# Patient Record
Sex: MALE | Race: WHITE | NOT HISPANIC OR LATINO | Employment: FULL TIME | URBAN - METROPOLITAN AREA
[De-identification: names, ages, dates, MRNs, and addresses within clinical notes are randomized per-mention and may not be internally consistent; named-entity substitution may affect disease eponyms.]

---

## 2024-08-27 RX ORDER — ASPIRIN 81 MG/1
1 TABLET ORAL DAILY
COMMUNITY
Start: 2021-10-11

## 2024-08-27 RX ORDER — CETIRIZINE HYDROCHLORIDE 10 MG/1
1 TABLET ORAL DAILY
COMMUNITY
Start: 2021-09-02

## 2024-08-27 RX ORDER — ATORVASTATIN CALCIUM 10 MG/1
1 TABLET, FILM COATED ORAL DAILY
COMMUNITY
Start: 2021-10-11 | End: 2024-08-28 | Stop reason: SDUPTHER

## 2024-08-27 RX ORDER — MULTIVITAMIN
1 TABLET ORAL DAILY
COMMUNITY
Start: 2021-09-02

## 2024-08-28 ENCOUNTER — OFFICE VISIT (OUTPATIENT)
Dept: CARDIOLOGY | Facility: CLINIC | Age: 54
End: 2024-08-28
Payer: COMMERCIAL

## 2024-08-28 VITALS
HEIGHT: 71 IN | WEIGHT: 187 LBS | SYSTOLIC BLOOD PRESSURE: 126 MMHG | HEART RATE: 81 BPM | DIASTOLIC BLOOD PRESSURE: 69 MMHG | OXYGEN SATURATION: 98 % | BODY MASS INDEX: 26.18 KG/M2

## 2024-08-28 DIAGNOSIS — Z98.890 HISTORY OF REPAIR OF THORACIC AORTIC ANEURYSM: ICD-10-CM

## 2024-08-28 DIAGNOSIS — Z86.79 HISTORY OF REPAIR OF THORACIC AORTIC ANEURYSM: ICD-10-CM

## 2024-08-28 DIAGNOSIS — E78.5 HYPERLIPIDEMIA, UNSPECIFIED HYPERLIPIDEMIA TYPE: Primary | ICD-10-CM

## 2024-08-28 PROCEDURE — 99213 OFFICE O/P EST LOW 20 MIN: CPT | Mod: 25 | Performed by: INTERNAL MEDICINE

## 2024-08-28 PROCEDURE — 93005 ELECTROCARDIOGRAM TRACING: CPT | Performed by: INTERNAL MEDICINE

## 2024-08-28 PROCEDURE — 3008F BODY MASS INDEX DOCD: CPT | Performed by: INTERNAL MEDICINE

## 2024-08-28 PROCEDURE — 93010 ELECTROCARDIOGRAM REPORT: CPT | Performed by: INTERNAL MEDICINE

## 2024-08-28 PROCEDURE — 99213 OFFICE O/P EST LOW 20 MIN: CPT | Performed by: INTERNAL MEDICINE

## 2024-08-28 RX ORDER — MOMETASONE FUROATE 1 MG/ML
SOLUTION TOPICAL
COMMUNITY
Start: 2024-08-08

## 2024-08-28 RX ORDER — ATORVASTATIN CALCIUM 10 MG/1
10 TABLET, FILM COATED ORAL DAILY
Qty: 90 TABLET | Refills: 3 | Status: SHIPPED | OUTPATIENT
Start: 2024-08-28

## 2024-08-28 ASSESSMENT — COLUMBIA-SUICIDE SEVERITY RATING SCALE - C-SSRS
1. IN THE PAST MONTH, HAVE YOU WISHED YOU WERE DEAD OR WISHED YOU COULD GO TO SLEEP AND NOT WAKE UP?: NO
6. HAVE YOU EVER DONE ANYTHING, STARTED TO DO ANYTHING, OR PREPARED TO DO ANYTHING TO END YOUR LIFE?: NO
2. HAVE YOU ACTUALLY HAD ANY THOUGHTS OF KILLING YOURSELF?: NO

## 2024-08-28 ASSESSMENT — PATIENT HEALTH QUESTIONNAIRE - PHQ9
SUM OF ALL RESPONSES TO PHQ9 QUESTIONS 1 AND 2: 0
2. FEELING DOWN, DEPRESSED OR HOPELESS: NOT AT ALL
1. LITTLE INTEREST OR PLEASURE IN DOING THINGS: NOT AT ALL

## 2024-08-28 NOTE — PATIENT INSTRUCTIONS
You were seen in the [facility] for follow up of your recent surgery to replace your thoracic aorta aneurysm (enlargement of the aorta) on 10/6/2021 with a 28 mm Gelweave prosthetic graft and repair your aortic valve.     You are doing well from recovering from surgery in 2021 and now are fully recovered. You can continue resistance training exercise, lifting objects at home, and using the .     Continue atorvastatin 10 mg a day to control your cholesterol. Your August 2022 cholesterol blood work is at goal for long term protection. I renewed this medication for another 12 months.     Eat a heart healthy diet. Limit portions of red meat. Eat fresh fruits and vegetables instead of processed carbohydrates. Olive oil (1 tablespoon a day), avocados, tree nuts as a source of omega-3 fat. Beans and legumes are good sources of plant based protein and fiber. The Mediterranean diet has been shown in clinical trials to reduce risk of heart disease by following these principles.      Follow up with Dr. Davis in 12 months.

## 2024-08-28 NOTE — PROGRESS NOTES
Subjective   Matthew Wade is a 54 y.o. male who presents to the [facility] for follow up of 10/6/2021 severe ascending thoracic aorta aneurysm replacement with prosthetic graft with aortic root reconstruction (replacement of NCC sinus with graft) and AV repair with anterior commissuroplasty. Last seen 2023     He had a severely enlarged ascending aorta aneurysm at 5.6 cm on 7/15/2021 CT chest scan (CT calcium score was low at 6). Had 10/6/2021 ascending aorta replacement with 28 mm Gelweave prosthesis and resection of NCC aneurysm with suture of Gelweave graft into the NCC. Partial repair of the aortic valve performed with suture of the anterior AV commissure by Dr. Nash and Dr. Miller.      Since our last visit, no active cardiac symptoms of chest pain, dyspnea on exertion, PND, orthopnea, MARCELLUS, palpitations, syncope, or claudication. Able to sleep on back or side without symptoms. Has resumed aerobic exercise at home. Weaned off metoprolol in spring 2022.     Prior heart testing included 3/24/2021 stress ECG treadmill test that was negative for ischemia at 11 METs of physical activity.     Past Medical History:  1. Thoracic aorta aneurysm s/p 10/6/2021 Gelweave 28 mm graft replacement  2. Trace coronary arteriosclerosis: 7/15/2021 CT calcium score 6  3. Dyslipidemia     Social History:  Lifetime nonsmoker. Workings in banking.     Family History:  Father  suddenly at 58 yo. Death certificate indicates MI as cause of death.     Review of Systems    A 14 point review of systems was asked. All questions were negative except for pertinent positives listed in the HPI.     Current Outpatient Medications on File Prior to Visit   Medication Sig Dispense Refill    aspirin 81 mg EC tablet Take 1 tablet (81 mg) by mouth once daily.      atorvastatin (Lipitor) 10 mg tablet Take 1 tablet (10 mg) by mouth once daily.      cetirizine (ZyrTEC) 10 mg tablet Take 1 tablet (10 mg) by  "mouth once daily.      mometasone (Elocon) 0.1 % lotion Apply a thin film topically 2 to 3 times per week as directed to affected areas      multivitamin tablet Take 1 tablet by mouth once daily.       No current facility-administered medications on file prior to visit.         Objective   Physical Exam  BP Readings from Last 3 Encounters:   24 126/69   23 123/71   22 110/72      Wt Readings from Last 3 Encounters:   24 84.8 kg (187 lb)   23 85.7 kg (189 lb)   22 83.6 kg (184 lb 4 oz)      BMI: Estimated body mass index is 26.08 kg/m² as calculated from the following:    Height as of this encounter: 1.803 m (5' 11\").    Weight as of this encounter: 84.8 kg (187 lb).  BSA: Estimated body surface area is 2.06 meters squared as calculated from the following:    Height as of this encounter: 1.803 m (5' 11\").    Weight as of this encounter: 84.8 kg (187 lb).    General: no acute distress  HEENT: EOMI, no scleral icterus.  Lungs: Clear to auscultation bilaterally without wheezing, rales, or rhonchi.  Cardiovascular: Regular rhythm and rate. Normal S1 and S2. No murmurs, rubs, or gallops are appreciated. JVP normal.  Abdomen: Soft, nontender, nondistended. Bowel sounds present.  Extremities: Warm and well perfused with equal 2+ pulses bilaterally.  No edema present.  Neurologic: Alert and oriented x3.      I have personally reviewed the following images and laboratory findings:  Last echocardiogram:  Most recent echo, 10/11/2021: LV EF 60-65%, no LVH (LVMI 49 gm/m2), post-op septal movement, normal diastology (E/e' 7), normal LA size, RV/RA not well seen, trace-mild AI, biuspid AV (peak demetris 2.26 m/sec, peak/mean AV gradient 20/10 mm Hg, RAKEL 1.6 cm2, DI 0.40), trace MR, trace TR, unable to estimate RVSP,     Last cath / stress test:  2021 CCTA: No coronary obstructive disease. Ascending aorta 5.4 cm. Bicuspid AV.    Most recent EC2024 ECG: Sinus rhythm, 81 bpm. Personally " "reviewed in office.    Lab Results   Component Value Date    CHOL 140 08/01/2022    CHOL 226 (H) 02/20/2021     Lab Results   Component Value Date    HDL 41.5 08/01/2022    HDL 47.0 02/20/2021     No results found for: \"LDLCALC\"  Lab Results   Component Value Date    TRIG 185 (H) 08/01/2022    TRIG 146 02/20/2021     No components found for: \"CHOLHDL\"      Assessment/Plan   1. Severe thoracic ascending aorta aneurysm s/p 10/6/2021 surgical replacement:    Ascending aorta was severely enlarged at 5.6 cm on 7/15/2021 CT chest scan. Had 10/6/2021 ascending aorta replacement with 28 mm Gelweave prosthesis and resection of NCC aneurysm with suture of Gelweave graft into the NCC. Partial repair of the bicuspid aortic valve performed with suture of the anterior AV commissure by Dr. Nash and Dr. Miller. Continue aspirin 81 mg a day for long term prophylaxis of graft material and aortic valve repair.     No murmur on exam today. Will observe without further heart testing at this visit.     2. Dyslipidemia / Trace coronary arteriosclerosis:  Continue atorvastatin 10 mg a day. Taking aspirin 81 mg a day for Gelweave aorta prosthesis. August 2022 lipid panel at goal.     Atorvastatin 10 mg day renewed for another 12 months.     Follow up with Dr. Davis in 12 months.            SIGNATURE: Roger Davis M.D.  [facility]  Senior Attending, Division of Cardiovascular Medicine  Co-Director, [facility]   of Medicine  [facility]  [address] [facility] [address]  [place] [id number]  Phone: [phone]  Fax: [phone]  Appointment: [phone]  PATIENT NAME: Matthew Wade   DATE/TIME: August 28, 2024 8:29 AM MRN: [id number]     "

## 2024-08-29 LAB
ATRIAL RATE: 81 BPM
P AXIS: 32 DEGREES
P OFFSET: 185 MS
P ONSET: 134 MS
PR INTERVAL: 166 MS
Q ONSET: 217 MS
QRS COUNT: 13 BEATS
QRS DURATION: 98 MS
QT INTERVAL: 372 MS
QTC CALCULATION(BAZETT): 432 MS
QTC FREDERICIA: 411 MS
R AXIS: 73 DEGREES
T AXIS: 64 DEGREES
T OFFSET: 403 MS
VENTRICULAR RATE: 81 BPM

## 2024-09-04 ENCOUNTER — HOSPITAL ENCOUNTER (OUTPATIENT)
Dept: RADIOLOGY | Facility: CLINIC | Age: 54
Discharge: HOME | End: 2024-09-04
Payer: COMMERCIAL

## 2024-09-04 ENCOUNTER — OFFICE VISIT (OUTPATIENT)
Dept: PRIMARY CARE | Facility: CLINIC | Age: 54
End: 2024-09-04
Payer: COMMERCIAL

## 2024-09-04 VITALS
HEART RATE: 95 BPM | BODY MASS INDEX: 26.57 KG/M2 | WEIGHT: 189.8 LBS | DIASTOLIC BLOOD PRESSURE: 66 MMHG | HEIGHT: 71 IN | SYSTOLIC BLOOD PRESSURE: 128 MMHG | OXYGEN SATURATION: 97 %

## 2024-09-04 DIAGNOSIS — M79.671 PAIN OF RIGHT HEEL: ICD-10-CM

## 2024-09-04 DIAGNOSIS — M76.60 ACHILLES TENDON PAIN: ICD-10-CM

## 2024-09-04 DIAGNOSIS — M76.60 ACHILLES TENDON PAIN: Primary | ICD-10-CM

## 2024-09-04 PROCEDURE — 73610 X-RAY EXAM OF ANKLE: CPT | Mod: RT

## 2024-09-04 PROCEDURE — 3008F BODY MASS INDEX DOCD: CPT | Performed by: NURSE PRACTITIONER

## 2024-09-04 PROCEDURE — 99213 OFFICE O/P EST LOW 20 MIN: CPT | Performed by: NURSE PRACTITIONER

## 2024-09-04 PROCEDURE — 73630 X-RAY EXAM OF FOOT: CPT | Mod: RIGHT SIDE | Performed by: RADIOLOGY

## 2024-09-04 PROCEDURE — 73630 X-RAY EXAM OF FOOT: CPT | Mod: RT

## 2024-09-04 RX ORDER — METHYLPREDNISOLONE 4 MG/1
TABLET ORAL
Qty: 21 TABLET | Refills: 0 | Status: SHIPPED | OUTPATIENT
Start: 2024-09-04 | End: 2024-09-11

## 2024-09-04 ASSESSMENT — PATIENT HEALTH QUESTIONNAIRE - PHQ9
1. LITTLE INTEREST OR PLEASURE IN DOING THINGS: NOT AT ALL
2. FEELING DOWN, DEPRESSED OR HOPELESS: NOT AT ALL
SUM OF ALL RESPONSES TO PHQ9 QUESTIONS 1 AND 2: 0

## 2024-09-04 NOTE — PROGRESS NOTES
"Subjective   Patient ID: Matthew Wade is a 54 y.o. male who presents for Ankle Pain (Right foot, onset about 2 weeks ago and worsened Sunday. Patient admits to tightness. ).    Patient of Dr. Perdomo.    Presents with acute right posterior ankle/achilles pain.  Started 2 weeks ago. Pain has been intermittent. Admits to walking around [place] recently and no issues, walked around the ER a lot today without much issue. Hurts in point specific places. Posterior heel and lateral ankle area.   He endorses walking with a slight limp due to the pain.  Ice helps.  Heel feels numb and most of the pain in the on the lateral ankle.  No foot drop or weakness.       Review of Systems  otherwise negative aside from what was mentioned above in HPI.    Objective   /66 (BP Location: Right arm, Patient Position: Sitting, BP Cuff Size: Adult)   Pulse 95   Ht 1.803 m (5' 11\")   Wt 86.1 kg (189 lb 12.8 oz)   SpO2 97%   BMI 26.47 kg/m²     Physical Exam  Constitutional:       Appearance: Normal appearance.   Cardiovascular:      Rate and Rhythm: Normal rate and regular rhythm.   Pulmonary:      Effort: Pulmonary effort is normal.   Musculoskeletal:         General: Tenderness present.      Comments: Point tender posterior heel and lateral ankle. Normal ROM. Slight swelling to achilles region.   Neurological:      General: No focal deficit present.      Mental Status: He is alert and oriented to person, place, and time. Mental status is at baseline.   Psychiatric:         Mood and Affect: Mood normal.         Behavior: Behavior normal.         Thought Content: Thought content normal.         Judgment: Judgment normal.         Assessment/Plan   Problem List Items Addressed This Visit             ICD-10-CM    Pain of right heel M79.671     XR of heel and ankle to rule out bony spur.  Can't take NSAIDs due to heart history.  Medrol pack ordered in case ice is not sufficient to help.  Discussed if no bony spurs likely an " achilles tendonitis. Gentle stretching, brace and ice.          Relevant Medications    methylPREDNISolone (Medrol Dospak) 4 mg tablets    Other Relevant Orders    XR ankle right 3+ views    XR foot right 3+ views    Achilles tendon pain - Primary M76.60    Relevant Medications    methylPREDNISolone (Medrol Dospak) 4 mg tablets    Other Relevant Orders    XR ankle right 3+ views    XR foot right 3+ views

## 2024-09-04 NOTE — ASSESSMENT & PLAN NOTE
XR of heel and ankle to rule out bony spur.  Can't take NSAIDs due to heart history.  Medrol pack ordered in case ice is not sufficient to help.  Discussed if no bony spurs likely an achilles tendonitis. Gentle stretching, brace and ice.

## 2025-01-07 ENCOUNTER — LAB (OUTPATIENT)
Dept: LAB | Facility: LAB | Age: 55
End: 2025-01-07
Payer: COMMERCIAL

## 2025-01-07 ENCOUNTER — APPOINTMENT (OUTPATIENT)
Dept: PRIMARY CARE | Facility: CLINIC | Age: 55
End: 2025-01-07
Payer: COMMERCIAL

## 2025-01-07 VITALS
BODY MASS INDEX: 25.98 KG/M2 | HEIGHT: 71 IN | WEIGHT: 185.6 LBS | DIASTOLIC BLOOD PRESSURE: 62 MMHG | SYSTOLIC BLOOD PRESSURE: 114 MMHG | HEART RATE: 93 BPM | OXYGEN SATURATION: 98 %

## 2025-01-07 DIAGNOSIS — Z00.00 ROUTINE HEALTH MAINTENANCE: Primary | ICD-10-CM

## 2025-01-07 DIAGNOSIS — Z12.5 SCREENING FOR PROSTATE CANCER: ICD-10-CM

## 2025-01-07 DIAGNOSIS — Z12.11 SCREEN FOR COLON CANCER: ICD-10-CM

## 2025-01-07 DIAGNOSIS — Z00.00 ROUTINE HEALTH MAINTENANCE: ICD-10-CM

## 2025-01-07 LAB
ALBUMIN SERPL BCP-MCNC: 4.7 G/DL (ref 3.4–5)
ALP SERPL-CCNC: 58 U/L (ref 33–120)
ALT SERPL W P-5'-P-CCNC: 26 U/L (ref 10–52)
ANION GAP SERPL CALC-SCNC: 13 MMOL/L (ref 10–20)
AST SERPL W P-5'-P-CCNC: 21 U/L (ref 9–39)
BILIRUB SERPL-MCNC: 0.7 MG/DL (ref 0–1.2)
BUN SERPL-MCNC: 16 MG/DL (ref 6–23)
CALCIUM SERPL-MCNC: 9.7 MG/DL (ref 8.6–10.6)
CHLORIDE SERPL-SCNC: 105 MMOL/L (ref 98–107)
CHOLEST SERPL-MCNC: 169 MG/DL (ref 0–199)
CHOLESTEROL/HDL RATIO: 3.7
CO2 SERPL-SCNC: 27 MMOL/L (ref 21–32)
CREAT SERPL-MCNC: 1.09 MG/DL (ref 0.5–1.3)
EGFRCR SERPLBLD CKD-EPI 2021: 81 ML/MIN/1.73M*2
ERYTHROCYTE [DISTWIDTH] IN BLOOD BY AUTOMATED COUNT: 12.3 % (ref 11.5–14.5)
GLUCOSE SERPL-MCNC: 92 MG/DL (ref 74–99)
HCT VFR BLD AUTO: 45.7 % (ref 41–52)
HDLC SERPL-MCNC: 45.9 MG/DL
HGB BLD-MCNC: 14.7 G/DL (ref 13.5–17.5)
LDLC SERPL CALC-MCNC: 94 MG/DL
MCH RBC QN AUTO: 29.8 PG (ref 26–34)
MCHC RBC AUTO-ENTMCNC: 32.2 G/DL (ref 32–36)
MCV RBC AUTO: 93 FL (ref 80–100)
NON HDL CHOLESTEROL: 123 MG/DL (ref 0–149)
NRBC BLD-RTO: 0 /100 WBCS (ref 0–0)
PLATELET # BLD AUTO: 340 X10*3/UL (ref 150–450)
POTASSIUM SERPL-SCNC: 4.9 MMOL/L (ref 3.5–5.3)
PROT SERPL-MCNC: 7.3 G/DL (ref 6.4–8.2)
PSA SERPL-MCNC: 2.52 NG/ML
RBC # BLD AUTO: 4.94 X10*6/UL (ref 4.5–5.9)
SODIUM SERPL-SCNC: 140 MMOL/L (ref 136–145)
TRIGL SERPL-MCNC: 146 MG/DL (ref 0–149)
VLDL: 29 MG/DL (ref 0–40)
WBC # BLD AUTO: 5.9 X10*3/UL (ref 4.4–11.3)

## 2025-01-07 PROCEDURE — 80053 COMPREHEN METABOLIC PANEL: CPT

## 2025-01-07 PROCEDURE — 80061 LIPID PANEL: CPT

## 2025-01-07 PROCEDURE — 84153 ASSAY OF PSA TOTAL: CPT

## 2025-01-07 PROCEDURE — 3008F BODY MASS INDEX DOCD: CPT | Performed by: INTERNAL MEDICINE

## 2025-01-07 PROCEDURE — 99396 PREV VISIT EST AGE 40-64: CPT | Performed by: INTERNAL MEDICINE

## 2025-01-07 PROCEDURE — 85027 COMPLETE CBC AUTOMATED: CPT

## 2025-01-07 ASSESSMENT — ENCOUNTER SYMPTOMS
NERVOUS/ANXIOUS: 0
POLYPHAGIA: 0
CHEST TIGHTNESS: 0
DYSPHORIC MOOD: 0
VOMITING: 0
SORE THROAT: 0
UNEXPECTED WEIGHT CHANGE: 0
DYSURIA: 0
POLYDIPSIA: 0
HEMATURIA: 0
PALPITATIONS: 0
BLOOD IN STOOL: 0
FEVER: 0
ABDOMINAL PAIN: 0
CONSTIPATION: 0
CHILLS: 0
HEADACHES: 0
ARTHRALGIAS: 0
WOUND: 0
FREQUENCY: 0
SHORTNESS OF BREATH: 0
MYALGIAS: 0
RHINORRHEA: 0
COUGH: 0
NAUSEA: 0
DIARRHEA: 0
WHEEZING: 0
DIZZINESS: 0
EYE PAIN: 0

## 2025-01-07 ASSESSMENT — PATIENT HEALTH QUESTIONNAIRE - PHQ9
2. FEELING DOWN, DEPRESSED OR HOPELESS: NOT AT ALL
1. LITTLE INTEREST OR PLEASURE IN DOING THINGS: NOT AT ALL
SUM OF ALL RESPONSES TO PHQ9 QUESTIONS 1 AND 2: 0

## 2025-01-07 NOTE — PROGRESS NOTES
"Subjective   Patient ID: Matthew Wade is a 54 y.o. male who presents for Annual Exam.    HPI     Dental visits- has scheduled    Exercise- treadmill, biking and weightsDiet-    Alcohol use- social, rare  Smoking- none    Has had congestion and sore throat. Post nasal drip. No fevers or chills. Not bad    Feels well    Review of Systems   Constitutional:  Negative for chills, fever and unexpected weight change.   HENT:  Positive for congestion. Negative for hearing loss, rhinorrhea and sore throat.    Eyes:  Negative for pain and visual disturbance.   Respiratory:  Negative for cough, chest tightness, shortness of breath and wheezing.    Cardiovascular:  Negative for chest pain and palpitations.   Gastrointestinal:  Negative for abdominal pain, blood in stool, constipation, diarrhea, nausea and vomiting.   Endocrine: Negative for cold intolerance, heat intolerance, polydipsia and polyphagia.   Genitourinary:  Negative for dysuria, frequency and hematuria.   Musculoskeletal:  Negative for arthralgias and myalgias.   Skin:  Negative for rash and wound.   Neurological:  Negative for dizziness, syncope and headaches.   Psychiatric/Behavioral:  Negative for dysphoric mood. The patient is not nervous/anxious.        Objective   /62 (BP Location: Left arm, Patient Position: Sitting, BP Cuff Size: Large adult)   Pulse 93   Ht 1.803 m (5' 11\")   Wt 84.2 kg (185 lb 9.6 oz)   SpO2 98%   BMI 25.89 kg/m²     Physical Exam  Vitals reviewed.   Constitutional:       Appearance: Normal appearance. He is not ill-appearing.   HENT:      Head: Normocephalic and atraumatic.      Right Ear: Tympanic membrane normal.      Left Ear: Tympanic membrane normal.      Nose: Nose normal.      Mouth/Throat:      Mouth: Mucous membranes are moist.      Pharynx: Oropharynx is clear.   Eyes:      Extraocular Movements: Extraocular movements intact.      Conjunctiva/sclera: Conjunctivae normal.      Pupils: Pupils are equal, round, and " reactive to light.   Cardiovascular:      Rate and Rhythm: Normal rate and regular rhythm.   Pulmonary:      Effort: Pulmonary effort is normal.      Breath sounds: Normal breath sounds. No wheezing.   Abdominal:      General: There is no distension.      Palpations: Abdomen is soft. There is no mass.      Tenderness: There is no abdominal tenderness.   Musculoskeletal:      Cervical back: Neck supple.      Right lower leg: No edema.      Left lower leg: No edema.   Lymphadenopathy:      Cervical: No cervical adenopathy.   Neurological:      General: No focal deficit present.      Mental Status: He is alert and oriented to person, place, and time.      Gait: Gait normal.   Psychiatric:         Mood and Affect: Mood normal.         Behavior: Behavior normal.         Thought Content: Thought content normal.         Assessment/Plan   Problem List Items Addressed This Visit    None  Visit Diagnoses         Codes    Routine health maintenance    -  Primary Z00.00    Relevant Orders    CBC    Comprehensive Metabolic Panel    Lipid Panel    Screening for prostate cancer     Z12.5    Relevant Orders    Prostate Specific Antigen    Screen for colon cancer     Z12.11    Relevant Orders    Colonoscopy Screening; Average Risk Patient             CPE completed.  Advised to keep a heart healthy, low fat diet with fruits and veggies like Mediterranean diet.  Advised on the importance of exercise and maintaining 150 minutes of exercise per week (30 minutes per day 5 days a week).  Advised on regular eye and dental visits.  Discussed age appropriate cancer screening, immunizations and recommendations given.  Discussed avoiding illicit drugs and tobacco. Advised on appropriate use of alcohol.  Advised to wear seat belt.     S/p aortic aneurysm repair and aortic valve repair 10/21 s/p washout/bicuspid aortic valve: healing up well  -on aspirin  -seeing cards yearly    Thrombocytosis: reactive, all gone  -on ASA     Fam hx of  CAD/hyperlipidemia: on atorvastatin, controlled 8/22 except TG-low fat diet     MSK chest pain: advised sound MSK from gutters and uses pectoralis muscle. Discussed stretching.      Hx of left femur fracture      CPE 1 year. Labs ordered     Health Maintenance  -Prostate Cancer Screening: wants to do  -Vaccinations: Advised shingrix. UTD tdap. UTD covid  -Colonoscopy: ordered  Banker manager

## 2025-03-01 ENCOUNTER — OFFICE VISIT (OUTPATIENT)
Dept: URGENT CARE | Age: 55
End: 2025-03-01
Payer: COMMERCIAL

## 2025-03-01 VITALS
SYSTOLIC BLOOD PRESSURE: 122 MMHG | BODY MASS INDEX: 25.94 KG/M2 | WEIGHT: 186 LBS | RESPIRATION RATE: 16 BRPM | OXYGEN SATURATION: 99 % | DIASTOLIC BLOOD PRESSURE: 69 MMHG | TEMPERATURE: 98.1 F | HEART RATE: 89 BPM

## 2025-03-01 DIAGNOSIS — S39.012A LOW BACK STRAIN, INITIAL ENCOUNTER: Primary | ICD-10-CM

## 2025-03-01 DIAGNOSIS — M79.18 LUMBAR MUSCLE PAIN: ICD-10-CM

## 2025-03-01 RX ORDER — CYCLOBENZAPRINE HCL 10 MG
TABLET ORAL
Qty: 20 TABLET | Refills: 0 | Status: SHIPPED | OUTPATIENT
Start: 2025-03-01

## 2025-03-01 RX ORDER — PREDNISONE 20 MG/1
TABLET ORAL
Qty: 10 TABLET | Refills: 0 | Status: SHIPPED | OUTPATIENT
Start: 2025-03-01

## 2025-03-01 ASSESSMENT — ENCOUNTER SYMPTOMS
CARDIOVASCULAR NEGATIVE: 1
ALLERGIC/IMMUNOLOGIC NEGATIVE: 1
CONSTITUTIONAL NEGATIVE: 1
ENDOCRINE NEGATIVE: 1
EYES NEGATIVE: 1
HEMATOLOGIC/LYMPHATIC NEGATIVE: 1
RESPIRATORY NEGATIVE: 1
GASTROINTESTINAL NEGATIVE: 1
NEUROLOGICAL NEGATIVE: 1
PSYCHIATRIC NEGATIVE: 1

## 2025-03-01 NOTE — LETTER
March 1, 2025     Patient: Matthew Wade   YOB: 1970   Date of Visit: 3/1/2025       To Whom It May Concern:    Matthew Wade was seen in my clinic on 3/1/2025 at 9:45 am. Please excuse Matthew for his absence from work on 3/1/25 , 3/2/25 , 3/3/25. May return to work on 3/4/25     If you have any questions or concerns, please don't hesitate to call.         Sincerely,         Lisa Thomas, NANCY-CNP        CC: No Recipients

## 2025-03-01 NOTE — PROGRESS NOTES
Subjective   Patient ID: Matthew Wade is a 54 y.o. male. They present today with a chief complaint of Back Pain (Lower back - 1 day - 6/10 ).    History of Present Illness  Patient is a 55 y/o male presenting with low back pain/strain flare-up x1 day. Patient reports low back injury initially occurred x30 years prior and flare-ups occur on occasion. Patient denies recent trauma/injury to region. Patient denies symptoms of numbness/tingling of extremities, loss of bowel/bladder control, difficulty ambulating, inability to bear weight on extremities, fever, chills, bodyaches, lethargy, weakness, chest pain/tightness/pressure, SOB, wheezing, N/V/D, ABD pain. OTC Tylenol taken with slight relief of symptoms.          Past Medical History  Allergies as of 03/01/2025 - Reviewed 03/01/2025   Allergen Reaction Noted    Amoxicillin Unknown 08/28/2024       (Not in a hospital admission)       History reviewed. No pertinent past medical history.    Past Surgical History:   Procedure Laterality Date    OTHER SURGICAL HISTORY  10/14/2021    Aortic valve repair        reports that he has never smoked. He has never used smokeless tobacco. He reports current alcohol use. He reports that he does not use drugs.    Review of Systems  Review of Systems   Constitutional: Negative.    HENT: Negative.     Eyes: Negative.    Respiratory: Negative.     Cardiovascular: Negative.    Gastrointestinal: Negative.    Endocrine: Negative.    Genitourinary: Negative.    Musculoskeletal:         Low back pain   Skin: Negative.    Allergic/Immunologic: Negative.    Neurological: Negative.    Hematological: Negative.    Psychiatric/Behavioral: Negative.                                    Objective    Vitals:    03/01/25 0954   BP: 122/69   Pulse: 89   Resp: 16   Temp: 36.7 °C (98.1 °F)   SpO2: 99%   Weight: 84.4 kg (186 lb)     No LMP for male patient.    Physical Exam  Constitutional:       Comments: Patient A/O x4, LOC 5, calm and cooperative.  Patient self-ambulatory to treatment area without a limp, and is in no acute distress.    HENT:      Head: Normocephalic and atraumatic.      Mouth/Throat:      Mouth: Mucous membranes are moist.   Eyes:      Extraocular Movements: Extraocular movements intact.      Conjunctiva/sclera: Conjunctivae normal.      Pupils: Pupils are equal, round, and reactive to light.   Cardiovascular:      Rate and Rhythm: Normal rate and regular rhythm.      Pulses: Normal pulses.      Heart sounds: Normal heart sounds.   Pulmonary:      Effort: Pulmonary effort is normal.      Breath sounds: Normal breath sounds.   Abdominal:      General: Abdomen is flat.   Genitourinary:     Comments: Negative CV tenderness bilaterally  Musculoskeletal:      Cervical back: Neck supple.      Comments: Bilateral cervical tilt/rotation, shoulder shrug, handgrip, bicep/tricep push-pull strengths, quadricep raise, calf resistance, dorsiplantar flexion/extension strengths are 5/5 and equal bilaterally. Patient reports increase in pain to bilateral lumbar regions with torso rotation and tilt along with bilateral quadricep raise. Sensation to light and deep palpation intact throughout; no paresthesias noted. Passive and active ROM completed with ease.    Skin:     General: Skin is warm and dry.      Capillary Refill: Capillary refill takes less than 2 seconds.   Neurological:      General: No focal deficit present.      Mental Status: He is oriented to person, place, and time.   Psychiatric:         Mood and Affect: Mood normal.         Behavior: Behavior normal.         Procedures    Point of Care Test & Imaging Results from this visit  No results found for this visit on 03/01/25.   No results found.    Diagnostic study results (if any) were reviewed by ARVIND Bell.    Assessment/Plan   Allergies, medications, history, and pertinent labs/EKGs/Imaging reviewed by ARVIND Bell.     Medical Decision Making  -Patient declined XR imaging  in office; reports no trauma/injury  -Will refer patient to Orthopedics for further follow-up and evaluation of chronic low back pain with flare-ups  -Will send Rx of Prednisone, Cyclobenzaprine for symptom support  -OTC Tylenol, warm compress as tolerated  -ED precautions discussed with patient and spouse    At time of discharge, patient was clinically well-appearing and appropriate for outpatient management. The patient/parent/guardian was educated regarding diagnosis, supportive care, OTC and Rx medications. The patient/parent/guardian was given the opportunity to ask questions prior to discharge. They verbalized understanding of discussion of treatment plan, expected course of illness and/or injury, indications on when to return to , when to seek further evaluation in ED/call 911, and the need to follow up with PCP and/or specialist as referred. Patient/parent/guardian was provided with work/school documentation if requested. Patient stable upon discharge.     Orders and Diagnoses  Diagnoses and all orders for this visit:  Low back strain, initial encounter  -     cyclobenzaprine (Flexeril) 10 mg tablet; Take 1 tablet (10mg) by mouth every 8 hours as needed for muscle pain/spasms. May cause drowsiness  -     predniSONE (Deltasone) 20 mg tablet; Take 1 tablet (20mg) by mouth twice daily x5 days. Take with food  -     Referral to Orthopaedic Surgery; Future  Lumbar muscle pain      Medical Admin Record      Patient disposition: Home    Electronically signed by ARVIND Bell  10:38 AM

## 2025-05-28 ENCOUNTER — TELEPHONE (OUTPATIENT)
Dept: SCHEDULING | Age: 55
End: 2025-05-28

## 2025-05-28 DIAGNOSIS — E78.5 HYPERLIPIDEMIA, UNSPECIFIED HYPERLIPIDEMIA TYPE: ICD-10-CM

## 2025-05-28 RX ORDER — ATORVASTATIN CALCIUM 10 MG/1
10 TABLET, FILM COATED ORAL DAILY
Qty: 90 TABLET | Refills: 3 | Status: SHIPPED | OUTPATIENT
Start: 2025-05-28

## 2025-07-22 ENCOUNTER — OFFICE VISIT (OUTPATIENT)
Dept: PRIMARY CARE | Facility: CLINIC | Age: 55
End: 2025-07-22
Payer: COMMERCIAL

## 2025-07-22 VITALS
WEIGHT: 188.6 LBS | OXYGEN SATURATION: 96 % | SYSTOLIC BLOOD PRESSURE: 124 MMHG | HEART RATE: 86 BPM | HEIGHT: 71 IN | BODY MASS INDEX: 26.4 KG/M2 | DIASTOLIC BLOOD PRESSURE: 68 MMHG

## 2025-07-22 DIAGNOSIS — L23.7 POISON IVY DERMATITIS: Primary | ICD-10-CM

## 2025-07-22 PROCEDURE — 3008F BODY MASS INDEX DOCD: CPT | Performed by: INTERNAL MEDICINE

## 2025-07-22 PROCEDURE — 1036F TOBACCO NON-USER: CPT | Performed by: INTERNAL MEDICINE

## 2025-07-22 PROCEDURE — 99213 OFFICE O/P EST LOW 20 MIN: CPT | Performed by: INTERNAL MEDICINE

## 2025-07-22 RX ORDER — PREDNISONE 10 MG/1
TABLET ORAL
Qty: 50 TABLET | Refills: 0 | Status: SHIPPED | OUTPATIENT
Start: 2025-07-22 | End: 2025-08-11

## 2025-07-22 RX ORDER — TRIAMCINOLONE ACETONIDE 1 MG/G
OINTMENT TOPICAL 2 TIMES DAILY PRN
Qty: 80 G | Refills: 0 | Status: SHIPPED | OUTPATIENT
Start: 2025-07-22 | End: 2025-11-19

## 2025-07-22 NOTE — PROGRESS NOTES
"Subjective   Patient ID: Matthew Wade is a 55 y.o. male who presents for Poison Ivy (First noticed Sunday, right and left arms/ hands.).    HPI     Here for poison ivy  Came in contact over weekend  Has in yard  Noticed Sunday and has spread  On arms and chest  Itchy  Has used calamine lotion    Review of Systems   All other systems reviewed and are negative.      Objective   /68 (BP Location: Left arm, Patient Position: Sitting, BP Cuff Size: Adult)   Pulse 86   Ht 1.803 m (5' 10.98\")   Wt 85.5 kg (188 lb 9.6 oz)   SpO2 96%   BMI 26.32 kg/m²     Physical Exam    Skin:     Comments: Linear vesicles noted with excoriations and area of raised erythematous patches on arms and chest         Assessment/Plan   Problem List Items Addressed This Visit    None  Visit Diagnoses         Codes      Poison ivy dermatitis    -  Primary L23.7    Relevant Medications    triamcinolone (Kenalog) 0.1 % ointment    predniSONE (Deltasone) 10 mg tablet        Steroid cream bid and prn  Advised can try this first and if not better steroid. Advised if miserable and cannot wait, can start prednisone. Advised to finish it if starts it  Call if not going away or changing       "

## 2025-08-27 ENCOUNTER — OFFICE VISIT (OUTPATIENT)
Dept: CARDIOLOGY | Facility: CLINIC | Age: 55
End: 2025-08-27
Payer: COMMERCIAL

## 2025-08-27 VITALS
HEIGHT: 70 IN | SYSTOLIC BLOOD PRESSURE: 127 MMHG | OXYGEN SATURATION: 98 % | BODY MASS INDEX: 26.92 KG/M2 | WEIGHT: 188 LBS | DIASTOLIC BLOOD PRESSURE: 66 MMHG | HEART RATE: 83 BPM

## 2025-08-27 DIAGNOSIS — Z86.79 HISTORY OF REPAIR OF THORACIC AORTIC ANEURYSM: ICD-10-CM

## 2025-08-27 DIAGNOSIS — Z98.890 HISTORY OF REPAIR OF THORACIC AORTIC ANEURYSM: ICD-10-CM

## 2025-08-27 DIAGNOSIS — E78.5 HYPERLIPIDEMIA, UNSPECIFIED HYPERLIPIDEMIA TYPE: Primary | ICD-10-CM

## 2025-08-27 PROCEDURE — 3008F BODY MASS INDEX DOCD: CPT | Performed by: INTERNAL MEDICINE

## 2025-08-27 PROCEDURE — 99213 OFFICE O/P EST LOW 20 MIN: CPT | Performed by: INTERNAL MEDICINE

## 2025-08-27 PROCEDURE — 99212 OFFICE O/P EST SF 10 MIN: CPT

## 2025-08-27 PROCEDURE — 93005 ELECTROCARDIOGRAM TRACING: CPT | Performed by: INTERNAL MEDICINE

## 2025-08-27 RX ORDER — ATORVASTATIN CALCIUM 10 MG/1
10 TABLET, FILM COATED ORAL DAILY
Qty: 90 TABLET | Refills: 3 | Status: SHIPPED | OUTPATIENT
Start: 2025-08-27

## 2025-08-27 ASSESSMENT — ENCOUNTER SYMPTOMS
OCCASIONAL FEELINGS OF UNSTEADINESS: 0
DEPRESSION: 0
LOSS OF SENSATION IN FEET: 0

## 2025-08-27 ASSESSMENT — PATIENT HEALTH QUESTIONNAIRE - PHQ9
2. FEELING DOWN, DEPRESSED OR HOPELESS: NOT AT ALL
SUM OF ALL RESPONSES TO PHQ9 QUESTIONS 1 AND 2: 0
1. LITTLE INTEREST OR PLEASURE IN DOING THINGS: NOT AT ALL

## 2025-08-27 ASSESSMENT — COLUMBIA-SUICIDE SEVERITY RATING SCALE - C-SSRS
1. IN THE PAST MONTH, HAVE YOU WISHED YOU WERE DEAD OR WISHED YOU COULD GO TO SLEEP AND NOT WAKE UP?: NO
2. HAVE YOU ACTUALLY HAD ANY THOUGHTS OF KILLING YOURSELF?: NO
6. HAVE YOU EVER DONE ANYTHING, STARTED TO DO ANYTHING, OR PREPARED TO DO ANYTHING TO END YOUR LIFE?: NO

## 2025-08-27 ASSESSMENT — PAIN SCALES - GENERAL: PAINLEVEL_OUTOF10: 0-NO PAIN

## 2025-08-29 LAB
ATRIAL RATE: 79 BPM
P AXIS: 5 DEGREES
P OFFSET: 185 MS
P ONSET: 137 MS
PR INTERVAL: 158 MS
Q ONSET: 216 MS
QRS COUNT: 13 BEATS
QRS DURATION: 92 MS
QT INTERVAL: 384 MS
QTC CALCULATION(BAZETT): 440 MS
QTC FREDERICIA: 421 MS
R AXIS: 43 DEGREES
T AXIS: 72 DEGREES
T OFFSET: 408 MS
VENTRICULAR RATE: 79 BPM

## 2026-01-08 ENCOUNTER — APPOINTMENT (OUTPATIENT)
Dept: PRIMARY CARE | Facility: CLINIC | Age: 56
End: 2026-01-08
Payer: COMMERCIAL